# Patient Record
Sex: FEMALE | Race: WHITE | Employment: UNEMPLOYED | URBAN - METROPOLITAN AREA
[De-identification: names, ages, dates, MRNs, and addresses within clinical notes are randomized per-mention and may not be internally consistent; named-entity substitution may affect disease eponyms.]

---

## 2023-04-26 ENCOUNTER — INITIAL CONSULT (OUTPATIENT)
Dept: BARIATRICS/WEIGHT MGMT | Age: 33
End: 2023-04-26
Payer: COMMERCIAL

## 2023-04-26 VITALS
SYSTOLIC BLOOD PRESSURE: 115 MMHG | DIASTOLIC BLOOD PRESSURE: 63 MMHG | HEART RATE: 89 BPM | BODY MASS INDEX: 43.15 KG/M2 | HEIGHT: 65 IN | TEMPERATURE: 98.1 F | RESPIRATION RATE: 20 BRPM | WEIGHT: 259 LBS

## 2023-04-26 DIAGNOSIS — K30 INDIGESTION: Primary | ICD-10-CM

## 2023-04-26 DIAGNOSIS — K21.9 GASTROESOPHAGEAL REFLUX DISEASE WITHOUT ESOPHAGITIS: ICD-10-CM

## 2023-04-26 DIAGNOSIS — E66.01 MORBID OBESITY DUE TO EXCESS CALORIES (HCC): ICD-10-CM

## 2023-04-26 PROCEDURE — G8417 CALC BMI ABV UP PARAM F/U: HCPCS | Performed by: SURGERY

## 2023-04-26 PROCEDURE — 99202 OFFICE O/P NEW SF 15 MIN: CPT

## 2023-04-26 PROCEDURE — G8428 CUR MEDS NOT DOCUMENT: HCPCS | Performed by: SURGERY

## 2023-04-26 PROCEDURE — 4004F PT TOBACCO SCREEN RCVD TLK: CPT | Performed by: SURGERY

## 2023-04-26 PROCEDURE — 99204 OFFICE O/P NEW MOD 45 MIN: CPT | Performed by: SURGERY

## 2023-04-26 RX ORDER — FLUTICASONE FUROATE AND VILANTEROL 200; 25 UG/1; UG/1
POWDER RESPIRATORY (INHALATION) DAILY
COMMUNITY

## 2023-04-26 RX ORDER — MEDROXYPROGESTERONE ACETATE 10 MG/1
TABLET ORAL
COMMUNITY

## 2023-04-26 NOTE — PATIENT INSTRUCTIONS
What is the next step to proceed with weight loss surgery? Please be aware that any co-pays or deductibles may be requested prior to testing and / or procedures. You will need to schedule a psychological evaluation for weight loss surgery. Patients will be required to complete all psychological testing as required by the mental health provider. Patients must also follow all of the provider's recommendations before weight loss surgery can be scheduled. The evaluation must be done a standard way for weight loss surgery. We strongly recommend that you contact one of our preferred providers listed below to arrange this:      Rashel Eason, [facility]  [address] [facility]   [phone]    Emily Guille and [address] [facility] [address] [facility]   [phone]    Dr. Portillo Bean, PhD    Zoey Yadav. [facility]    [phone]      You will also need to plan on attending a 2 hour nutrition class at the [facility] prior to your surgery. We will schedule this for you when we schedule your surgery. Please remember to have your labs drawn 10 days prior to your first scheduled dietary appointment. Please remember, that while we will submit your case to insurance for surgery authorization, it is your responsibility to know if your plan covers weight loss surgery and keep up-to-date with changes to your insurance coverage. We will do everything possible to help you get approved for weight loss surgery, but cannot guarantee an approval.     Please note that you will not be submitted to your insurance company until all pre-operative testing requirements are met.

## 2023-04-26 NOTE — PROGRESS NOTES
Sandra Villanueva  4/26/2023  [facility]    Initial Evaluation  Bariatric Surgery and EGD       Subjective:  CHIEF COMPLAINT: Morbid obesity, malnutrition,     HISTORY OF PRESENT ILLNESS: Sandra Villanueva is a morbidly-obese 28 y.o.  female, who weighs 259 lb (117.5 kg). She is 104 pounds over her ideal body weight. The Body mass index is 43.1 kg/m². She has multiple medical problems aggravated by her obesity. She wishes to have bariatric surgery so that she can lose a large amount of weight and keep the weight off. I have met with her in the [facility] where we discussed the surgery in great detail and went over the risks and benefits. She has watched our informational video so she understands all of the extensive risks involved. She states that she understands all of the risks and wishes to proceed with the evaluation. Past Medical History  Patient Active Problem List   Diagnosis    Gestational diabetes mellitus, antepartum    Obesity complicating pregnancy, childbirth, or puerperium, antepartum    High-risk pregnancy     Past Surgical History  Past Surgical History:   Procedure Laterality Date    TONSILLECTOMY      WISDOM TOOTH EXTRACTION        Allergies: Adhesive tape   No family history on file. Home Medications  Current Outpatient Medications   Medication Sig Dispense Refill    medroxyPROGESTERone (PROVERA) 10 MG tablet medroxyprogesterone 10 mg tablet   take 1 tablet by mouth once daily      fluticasone furoate-vilanterol (BREO ELLIPTA) 200-25 MCG/ACT AEPB inhaler Inhale into the lungs daily      fluticasone (FLONASE) 50 MCG/ACT nasal spray SPRAY 2 SPRAYS IN EACH NOSTRIL EVERY DAY 1 Bottle 0     No current facility-administered medications for this visit. Social History:   TOBACCO:   reports that she has been smoking cigarettes. She has a 2.50 pack-year smoking history.  She has never used smokeless tobacco.  All smokers must join the free

## 2023-05-01 ENCOUNTER — TELEPHONE (OUTPATIENT)
Dept: BARIATRICS/WEIGHT MGMT | Age: 33
End: 2023-05-01

## 2023-05-01 ENCOUNTER — PREP FOR PROCEDURE (OUTPATIENT)
Dept: BARIATRICS/WEIGHT MGMT | Age: 33
End: 2023-05-01

## 2023-05-01 PROBLEM — K21.9 GASTROESOPHAGEAL REFLUX DISEASE: Status: ACTIVE | Noted: 2023-05-01

## 2023-05-01 NOTE — TELEPHONE ENCOUNTER
Prior Authorization Form  DEMOGRAPHICS:    Patient Name:  Rubi Ro  Patient :  1990            Insurance:  Payor: [employer] / Plan: [address] [place] [address] / Product Type: *No Product type* /   Insurance ID Number:    Payer/Plan Subscr  Sex Relation Sub. Ins. ID Effective Group Num   1. [id number] 1990 Female Self [id number]                                   [address]   2.  Jessica Frandy 1990 Female Self [id number] 3/1/17 [id number]                                   [address]         DIAGNOSIS & PROCEDURE:    Procedure/Operation: EGD           CPT Code: 16012    Diagnosis:  Dominga Wells    ICD10 Code: P872    Location:  [facility]    Surgeon:  Lebron Gillette INFORMATION:    Date: 2023    Time: N/A              Anesthesia:  MAC/TIVA                                                       Status:  Outpatient        Special Comments:         Electronically signed by Jas Lane MA on 2023 at 9:39 AM

## 2023-05-02 RX ORDER — SODIUM CHLORIDE, SODIUM LACTATE, POTASSIUM CHLORIDE, CALCIUM CHLORIDE 600; 310; 30; 20 MG/100ML; MG/100ML; MG/100ML; MG/100ML
INJECTION, SOLUTION INTRAVENOUS CONTINUOUS
Status: CANCELLED | OUTPATIENT
Start: 2023-05-02

## 2023-05-17 ENCOUNTER — TELEPHONE (OUTPATIENT)
Dept: BARIATRICS/WEIGHT MGMT | Age: 33
End: 2023-05-17

## 2023-05-17 NOTE — TELEPHONE ENCOUNTER
[employer] denied EGD, she has a secondary of [employer]. We will have her still go and let the primary deny and bill through the secondary.

## 2023-05-17 NOTE — TELEPHONE ENCOUNTER
Patient called stating she received a denial from her insurance co. For EGD and anesthesia due to not having any hx of acid reflux. Her surgeon is Dr. Debra Arriaga. Her scope is scheduled for next Tuesday. Wants to know what she should do. I let her know we would check and get back to her.

## 2023-05-22 ENCOUNTER — TELEPHONE (OUTPATIENT)
Dept: BARIATRICS/WEIGHT MGMT | Age: 33
End: 2023-05-22

## 2023-05-22 NOTE — TELEPHONE ENCOUNTER
Franco Bravo from pre admin testing called and stated that the patient cancelled her EGD scheduled with Dr. Alyssa Slaughter on 5/23/2023.

## 2025-05-14 ENCOUNTER — OFFICE VISIT (OUTPATIENT)
Age: 35
End: 2025-05-14
Payer: COMMERCIAL

## 2025-05-14 VITALS
SYSTOLIC BLOOD PRESSURE: 133 MMHG | RESPIRATION RATE: 18 BRPM | TEMPERATURE: 98.1 F | OXYGEN SATURATION: 98 % | HEART RATE: 84 BPM | BODY MASS INDEX: 43.1 KG/M2 | HEIGHT: 65 IN | DIASTOLIC BLOOD PRESSURE: 84 MMHG

## 2025-05-14 DIAGNOSIS — N88.8 FRIABLE CERVIX: ICD-10-CM

## 2025-05-14 DIAGNOSIS — N60.12 FIBROCYSTIC BREAST CHANGES OF BOTH BREASTS: ICD-10-CM

## 2025-05-14 DIAGNOSIS — R10.2 TENDERNESS OF FEMALE PELVIC ORGANS: ICD-10-CM

## 2025-05-14 DIAGNOSIS — N64.4 BREAST TENDERNESS: ICD-10-CM

## 2025-05-14 DIAGNOSIS — N89.8 VAGINAL DISCHARGE: ICD-10-CM

## 2025-05-14 DIAGNOSIS — Z01.419 WELL WOMAN EXAM: Primary | ICD-10-CM

## 2025-05-14 DIAGNOSIS — N92.1 MENORRHAGIA WITH IRREGULAR CYCLE: ICD-10-CM

## 2025-05-14 DIAGNOSIS — N60.11 FIBROCYSTIC BREAST CHANGES OF BOTH BREASTS: ICD-10-CM

## 2025-05-14 PROCEDURE — 99385 PREV VISIT NEW AGE 18-39: CPT

## 2025-05-14 PROCEDURE — 99203 OFFICE O/P NEW LOW 30 MIN: CPT

## 2025-05-14 RX ORDER — TIRZEPATIDE 15 MG/.5ML
INJECTION, SOLUTION SUBCUTANEOUS
COMMUNITY
Start: 2025-04-01

## 2025-05-14 RX ORDER — CHOLECALCIFEROL (VITAMIN D3) 1250 MCG
1 CAPSULE ORAL WEEKLY
COMMUNITY
Start: 2025-04-11

## 2025-05-14 RX ORDER — CYANOCOBALAMIN 1000 UG/ML
INJECTION, SOLUTION INTRAMUSCULAR; SUBCUTANEOUS
COMMUNITY
Start: 2025-04-25

## 2025-05-14 RX ORDER — TIRZEPATIDE 12.5 MG/.5ML
INJECTION, SOLUTION SUBCUTANEOUS
COMMUNITY
Start: 2025-03-08 | End: 2025-05-14

## 2025-05-14 SDOH — ECONOMIC STABILITY: FOOD INSECURITY: WITHIN THE PAST 12 MONTHS, YOU WORRIED THAT YOUR FOOD WOULD RUN OUT BEFORE YOU GOT MONEY TO BUY MORE.: NEVER TRUE

## 2025-05-14 SDOH — ECONOMIC STABILITY: FOOD INSECURITY: WITHIN THE PAST 12 MONTHS, THE FOOD YOU BOUGHT JUST DIDN'T LAST AND YOU DIDN'T HAVE MONEY TO GET MORE.: NEVER TRUE

## 2025-05-14 ASSESSMENT — ENCOUNTER SYMPTOMS
DIARRHEA: 1
TROUBLE SWALLOWING: 0
APNEA: 0
SHORTNESS OF BREATH: 0
CHEST TIGHTNESS: 0
VOICE CHANGE: 0
CHOKING: 0
WHEEZING: 0
STRIDOR: 0
VOMITING: 0
ABDOMINAL PAIN: 0
COUGH: 0
FACIAL SWELLING: 0
CONSTIPATION: 0
COLOR CHANGE: 0
NAUSEA: 0

## 2025-05-14 ASSESSMENT — PATIENT HEALTH QUESTIONNAIRE - PHQ9
SUM OF ALL RESPONSES TO PHQ QUESTIONS 1-9: 0
SUM OF ALL RESPONSES TO PHQ QUESTIONS 1-9: 0
2. FEELING DOWN, DEPRESSED OR HOPELESS: NOT AT ALL
SUM OF ALL RESPONSES TO PHQ QUESTIONS 1-9: 0
SUM OF ALL RESPONSES TO PHQ QUESTIONS 1-9: 0
1. LITTLE INTEREST OR PLEASURE IN DOING THINGS: NOT AT ALL

## 2025-05-14 NOTE — PROGRESS NOTES
Subjective:      Katherine Nicole is a 34 y.o. female, , here for GYN exam.      Current Complaints: Routine Gyn Exam  Patient here for routine exam.  Current Complaints: irregular cycles.  For the last year, menses are 1.5 months between cycles. Usually bleeds heavy with clots for 4 days. Had sklya until 2023 with light spotting. Considering pregnancy in near future but wants cycle control now. Discussed R/B/A for Mirena for HMB.  Just had blood work last month- will send for records     Gynecologic History  Patient's last menstrual period was 2025.  Contraception: none  Last Pap: 2023  Results: normal- was told she has scarring on her cervix  Last Mammogram: long time ago, due to lump- was normal    Obstetric History  : 2  Para: 1- induced long delivery  AB: 1    Review of Systems  Review of Systems   Constitutional:  Negative for activity change, appetite change, chills, diaphoresis, fatigue, fever and unexpected weight change.   HENT:  Negative for facial swelling, trouble swallowing and voice change.    Respiratory:  Negative for apnea, cough, choking, chest tightness, shortness of breath, wheezing and stridor.    Cardiovascular:  Negative for chest pain, palpitations and leg swelling.   Gastrointestinal:  Positive for diarrhea (with menses). Negative for abdominal pain, constipation, nausea and vomiting.   Genitourinary:  Positive for menstrual problem. Negative for difficulty urinating, dyspareunia, dysuria, flank pain, pelvic pain, vaginal bleeding and vaginal discharge.   Skin:  Negative for color change, pallor and rash.   Neurological:  Positive for headaches. Negative for light-headedness and numbness.   Psychiatric/Behavioral:  Negative for confusion, sleep disturbance and suicidal ideas.         Objective:       /84   Pulse 84   Temp 98.1 °F (36.7 °C)   Resp 18   Ht 1.651 m (5' 5\")   LMP 2025   SpO2 98%   BMI 43.10 kg/m²   General appearance: alert, appears

## 2025-05-14 NOTE — PROGRESS NOTES
New patient here to establish care  Pt had Susan IUD for three years, had removed 2023  Periods have been irregular since  LMP 4/29/25   Last pap 5/2023    Discharge instructions have been discussed with the patient. Patient advised to call our office with any questions or concerns. Voiced understanding.

## 2025-05-15 DIAGNOSIS — N89.8 VAGINAL DISCHARGE: ICD-10-CM

## 2025-05-15 DIAGNOSIS — N88.8 FRIABLE CERVIX: ICD-10-CM

## 2025-05-15 LAB
HPV SAMPLE: NORMAL
HPV SOURCE: NORMAL
HPV, GENOTYPE 16: NORMAL
HPV, GENOTYPE 18: NORMAL
HPV, HIGH RISK OTHER: NORMAL
HPV, INTERPRETATION: NORMAL

## 2025-05-17 ENCOUNTER — RESULTS FOLLOW-UP (OUTPATIENT)
Dept: OBGYN | Age: 35
End: 2025-05-17

## 2025-05-17 RX ORDER — METRONIDAZOLE 500 MG/1
500 TABLET ORAL 2 TIMES DAILY
Qty: 14 TABLET | Refills: 0 | Status: SHIPPED | OUTPATIENT
Start: 2025-05-17 | End: 2025-05-24

## 2025-05-20 LAB
GYNECOLOGY CYTOLOGY REPORT: NORMAL
HPV SAMPLE: NORMAL
HPV SOURCE: NORMAL
HPV, GENOTYPE 16: NOT DETECTED
HPV, GENOTYPE 18: NOT DETECTED
HPV, HIGH RISK OTHER: NOT DETECTED
HPV, INTERPRETATION: NORMAL

## 2025-06-11 DIAGNOSIS — N60.12 FIBROCYSTIC BREAST CHANGES OF BOTH BREASTS: ICD-10-CM

## 2025-06-11 DIAGNOSIS — N64.4 BREAST TENDERNESS: Primary | ICD-10-CM

## 2025-06-11 DIAGNOSIS — N60.11 FIBROCYSTIC BREAST CHANGES OF BOTH BREASTS: ICD-10-CM

## 2025-06-12 DIAGNOSIS — N64.4 BREAST TENDERNESS: Primary | ICD-10-CM

## 2025-07-18 ENCOUNTER — HOSPITAL ENCOUNTER (OUTPATIENT)
Dept: GENERAL RADIOLOGY | Age: 35
Discharge: HOME | End: 2025-07-20

## 2025-07-18 ENCOUNTER — CLINICAL DOCUMENTATION (OUTPATIENT)
Dept: GENERAL RADIOLOGY | Age: 35
End: 2025-07-18

## 2025-07-18 DIAGNOSIS — N64.4 BREAST TENDERNESS: ICD-10-CM

## 2025-07-18 NOTE — PROGRESS NOTES
Patient presented today at the breast center for imaging.  Patient questioned why she needed these tests.  Explained the order had breast tenderness and fibrocystic breasts as the reason.  Patient stated she didn't think she needed these tests, especially because of the out of pocket cost.  Patient also thought she would be getting a transvaginal US at the breast center as well.  Provided the number for central scheduling so she could get that test scheduled.  Patient will discuss with her provider before proceeding with the breast imaging.  Notified provider.